# Patient Record
Sex: MALE | Race: WHITE | NOT HISPANIC OR LATINO | Employment: FULL TIME | URBAN - METROPOLITAN AREA
[De-identification: names, ages, dates, MRNs, and addresses within clinical notes are randomized per-mention and may not be internally consistent; named-entity substitution may affect disease eponyms.]

---

## 2018-10-05 ENCOUNTER — APPOINTMENT (OUTPATIENT)
Dept: LAB | Facility: HOSPITAL | Age: 63
End: 2018-10-05
Payer: COMMERCIAL

## 2018-10-05 ENCOUNTER — TRANSCRIBE ORDERS (OUTPATIENT)
Dept: ADMINISTRATIVE | Facility: HOSPITAL | Age: 63
End: 2018-10-05

## 2018-10-05 DIAGNOSIS — G40.909 NONINTRACTABLE EPILEPSY WITHOUT STATUS EPILEPTICUS, UNSPECIFIED EPILEPSY TYPE (HCC): ICD-10-CM

## 2018-10-05 DIAGNOSIS — G40.909 NONINTRACTABLE EPILEPSY WITHOUT STATUS EPILEPTICUS, UNSPECIFIED EPILEPSY TYPE (HCC): Primary | ICD-10-CM

## 2018-10-05 LAB — PHENYTOIN SERPL-MCNC: 21.5 UG/ML (ref 10–20)

## 2018-10-05 PROCEDURE — 36415 COLL VENOUS BLD VENIPUNCTURE: CPT

## 2018-10-05 PROCEDURE — 80185 ASSAY OF PHENYTOIN TOTAL: CPT

## 2019-09-18 ENCOUNTER — OFFICE VISIT (OUTPATIENT)
Dept: SURGERY | Facility: CLINIC | Age: 64
End: 2019-09-18
Payer: MEDICARE

## 2019-09-18 VITALS
BODY MASS INDEX: 33.03 KG/M2 | WEIGHT: 223 LBS | HEART RATE: 80 BPM | DIASTOLIC BLOOD PRESSURE: 80 MMHG | SYSTOLIC BLOOD PRESSURE: 118 MMHG | HEIGHT: 69 IN | RESPIRATION RATE: 18 BRPM

## 2019-09-18 DIAGNOSIS — K43.0 INCISIONAL HERNIA WITH OBSTRUCTION BUT NO GANGRENE: Primary | ICD-10-CM

## 2019-09-18 PROCEDURE — 99203 OFFICE O/P NEW LOW 30 MIN: CPT | Performed by: SPECIALIST

## 2019-09-18 RX ORDER — ATORVASTATIN CALCIUM 40 MG/1
1 TABLET, FILM COATED ORAL
COMMUNITY
Start: 2016-03-16

## 2019-09-18 RX ORDER — HYDROCHLOROTHIAZIDE 25 MG/1
1 TABLET ORAL
COMMUNITY
Start: 2017-09-20

## 2019-09-18 RX ORDER — CALCITRIOL 0.25 UG/1
1 CAPSULE, LIQUID FILLED ORAL
COMMUNITY
Start: 2018-12-05

## 2019-09-18 RX ORDER — PHENYTOIN SODIUM 100 MG/1
1 CAPSULE, EXTENDED RELEASE ORAL DAILY
COMMUNITY
Start: 2017-08-04 | End: 2019-09-18

## 2019-09-18 RX ORDER — SODIUM BICARBONATE 650 MG/1
1300 TABLET ORAL
COMMUNITY
Start: 2019-09-11

## 2019-09-18 RX ORDER — AMLODIPINE BESYLATE 10 MG/1
1 TABLET ORAL
COMMUNITY
Start: 2016-03-16

## 2019-09-18 RX ORDER — ERGOCALCIFEROL 1.25 MG/1
1 CAPSULE ORAL
COMMUNITY
Start: 2017-10-12

## 2019-09-18 RX ORDER — LISINOPRIL 20 MG/1
1 TABLET ORAL 2 TIMES DAILY
COMMUNITY
Start: 2016-03-16

## 2019-09-18 RX ORDER — FOLIC ACID 1 MG/1
1 TABLET ORAL
COMMUNITY
Start: 2016-03-22

## 2019-09-18 RX ORDER — PHENYTOIN SODIUM 100 MG/1
100 CAPSULE, EXTENDED RELEASE ORAL
COMMUNITY
Start: 2016-03-10

## 2019-09-18 NOTE — PROGRESS NOTES
Jemima  presents today with his brother Ar Mitchell who is a previous patient of ours here in the office  There is a paucity of information available at this visit  He has a left-sided incisional hernia status post left nephrectomy  Presumably he underwent left nephrectomy for tumor  He also underwent a partial right nephrectomy for masses in the right kidney  Presumably these were benign also  He has a large bulge in the left lateral aspect of his abdomen just superior to the left iliac crest   He has had this for 2 or 3 years since he underwent the left nephrectomy  This was done at another institution by other surgeons  He had a CT scan of the abdomen a few months ago and the results were available although the films were not  This was read as stable appearance of partially visualized large spigelian hernia containing nonobstructed bowel loops on the left  Physical exam:  Middle-aged white male awake alert with a visible bulge that could be seen under assured  Abdomen multiple scars are noted mainly in the right upper quadrant transversely left lower quadrant transversely and also some laparoscopic incisions  There is a visible bulge at the area of the left scar above this that is reducible with the patient in the supine position  There appears to be a hernial defect in this area after reducing it with the iliac crest forming part of the defect  Impression:  Incisional hernia status post left nephrectomy  Plan: It is imperative to see the CT scan to evaluate this  At this point we will attempt to get the DVTs of the scan and we will notify the patient bring him in for re-evaluation

## 2019-11-05 ENCOUNTER — OFFICE VISIT (OUTPATIENT)
Dept: SURGERY | Facility: CLINIC | Age: 64
End: 2019-11-05
Payer: MEDICARE

## 2019-11-05 VITALS
SYSTOLIC BLOOD PRESSURE: 114 MMHG | RESPIRATION RATE: 18 BRPM | BODY MASS INDEX: 33.1 KG/M2 | WEIGHT: 223.5 LBS | HEIGHT: 69 IN | HEART RATE: 92 BPM | DIASTOLIC BLOOD PRESSURE: 68 MMHG

## 2019-11-05 DIAGNOSIS — K43.0 INCISIONAL HERNIA WITH OBSTRUCTION BUT NO GANGRENE: Primary | ICD-10-CM

## 2019-11-05 PROCEDURE — 99213 OFFICE O/P EST LOW 20 MIN: CPT | Performed by: SPECIALIST

## 2019-11-05 NOTE — PROGRESS NOTES
Evangelista Callaway presents today with his brother Josue Gallegos who is a previous patient of ours in regards to a large incisional hernia  He was seen in September in regards to this  He says he had a previous CT scan at [facility]  Apparently had a left radical nephrectomy robotically performed for a 1 8 cm renal cell carcinoma  This occurred in September 2016  He says immediately after the surgery he developed an incisional hernia which was essentially ignored  We developed a few lesion on the right kidney and a right radical nephrectomy was recommended  He went to the [address] [place] [address] for 2nd opinion  They performed partial nephrectomy and he still has a functioning right kidney now  The lesions were of course benign  The CT scan was reviewed and unfortunately it was only of the abdomen  It did not go down as far as the pelvis which is where a significant portion of the hernia resides  He has obvious bowel incarcerated in the hernia that is partially reducible  Physical exam:  Middle-aged white male awake alert no distress    Abdomen:  Obese soft is a large bulge in the left lateral aspect/left lower quadrant of his abdomen  There is a scar present  There is palpable bowel present which is partially reducible  Nontender to palpation  Impression:  He would incisional hernia left side  Plan:  I would prefer to have him undergo CT scan of the abdomen and pelvis to see where the iliac crest factors into this  If he undergoes repair we may have to drill into the bone itself to a fix mesh to repair this large hernia  We will schedule this ASAP

## 2019-11-19 ENCOUNTER — TRANSCRIBE ORDERS (OUTPATIENT)
Dept: ADMINISTRATIVE | Facility: HOSPITAL | Age: 64
End: 2019-11-19

## 2019-11-19 DIAGNOSIS — E04.1 THYROID NODULE: Primary | ICD-10-CM

## 2019-11-21 ENCOUNTER — HOSPITAL ENCOUNTER (OUTPATIENT)
Dept: ULTRASOUND IMAGING | Facility: HOSPITAL | Age: 64
Discharge: HOME | End: 2019-11-21
Payer: MEDICARE

## 2019-11-21 DIAGNOSIS — E04.1 THYROID NODULE: ICD-10-CM

## 2019-11-21 PROCEDURE — 76536 US EXAM OF HEAD AND NECK: CPT

## 2021-01-27 ENCOUNTER — TRANSCRIBE ORDERS (OUTPATIENT)
Dept: ADMINISTRATIVE | Facility: HOSPITAL | Age: 66
End: 2021-01-27

## 2021-01-27 DIAGNOSIS — R94.6 ABNORMAL RESULTS OF THYROID FUNCTION STUDIES: Primary | ICD-10-CM

## 2021-02-03 ENCOUNTER — HOSPITAL ENCOUNTER (OUTPATIENT)
Dept: ULTRASOUND IMAGING | Facility: HOSPITAL | Age: 66
Discharge: HOME | End: 2021-02-03
Payer: MEDICARE

## 2021-02-03 DIAGNOSIS — R94.6 ABNORMAL RESULTS OF THYROID FUNCTION STUDIES: ICD-10-CM

## 2021-02-03 PROCEDURE — 76536 US EXAM OF HEAD AND NECK: CPT

## 2022-03-30 ENCOUNTER — HOSPITAL ENCOUNTER (OUTPATIENT)
Dept: ULTRASOUND IMAGING | Facility: HOSPITAL | Age: 67
Discharge: HOME | End: 2022-03-30
Payer: MEDICARE

## 2022-03-30 DIAGNOSIS — R94.6 ABNORMAL RESULTS OF THYROID FUNCTION STUDIES: ICD-10-CM

## 2022-03-30 PROCEDURE — 76536 US EXAM OF HEAD AND NECK: CPT
